# Patient Record
Sex: FEMALE | URBAN - METROPOLITAN AREA
[De-identification: names, ages, dates, MRNs, and addresses within clinical notes are randomized per-mention and may not be internally consistent; named-entity substitution may affect disease eponyms.]

---

## 2020-12-02 ENCOUNTER — APPOINTMENT (RX ONLY)
Dept: URBAN - METROPOLITAN AREA CLINIC 55 | Facility: CLINIC | Age: 55
Setting detail: DERMATOLOGY
End: 2020-12-02

## 2020-12-02 DIAGNOSIS — Z41.9 ENCOUNTER FOR PROCEDURE FOR PURPOSES OTHER THAN REMEDYING HEALTH STATE, UNSPECIFIED: ICD-10-CM

## 2020-12-02 PROCEDURE — ? DYSPORT

## 2020-12-02 PROCEDURE — ? FILLERS

## 2020-12-02 NOTE — PROCEDURE: FILLERS
Consent: Written consent was obtained.
Lot #: [id number]
Nasolabial Folds Filler Volume In Cc: 0
Filler Comments: Cheeks
Include Cannula Information In Note?: No
Anesthesia Type: 1% lidocaine with epinephrine
Map Statment: See Attach Map for Complete Details
Anesthesia Volume In Cc: 0.5
Filler: Edelmira Dickson
Expiration Date (Month Year): 03/31/2023
Post-Care Instructions: After care instructions were provided to the patient.
Expiration Date (Month Year): 3/31/2023
Detail Level: Simple
Filler Comments: NALLELY/Glen
Include Cannula Size?: 27G

## 2020-12-02 NOTE — PROCEDURE: DYSPORT
Right Pupillary Line Units: 0
Additional Area 1 Location: chin
Show Right And Left Periorbital Units: No
Detail Level: Simple
Show Glabellar Units: Yes
Lot #: [id number]
Glabellar Complex Units: 50
Consent: Written consent was obtained.
Forehead Units: 2.5
Expiration Date (Month Year): 07/31/2021
Dilution (U/0.1 Cc): 10
Post-Care Instructions: After care instructions were provided to the patient.

## 2021-03-05 ENCOUNTER — APPOINTMENT (RX ONLY)
Dept: URBAN - METROPOLITAN AREA CLINIC 55 | Facility: CLINIC | Age: 56
Setting detail: DERMATOLOGY
End: 2021-03-05

## 2021-03-05 DIAGNOSIS — Z41.9 ENCOUNTER FOR PROCEDURE FOR PURPOSES OTHER THAN REMEDYING HEALTH STATE, UNSPECIFIED: ICD-10-CM

## 2021-03-05 PROCEDURE — ? DYSPORT

## 2021-03-05 NOTE — PROCEDURE: DYSPORT
Right Pupillary Line Units: 0
Show Depressor Anguli Units: Yes
Consent: Written consent was obtained.
Show Lcl Units: No
Post-Care Instructions: After care instructions were provided to the patient.
Dilution (U/0.1 Cc): 10
Detail Level: Simple
Lot #: [id number]
Glabellar Complex Units: 52.5
Additional Area 1 Location: chin
Expiration Date (Month Year): 06/30/21

## 2022-02-24 ENCOUNTER — APPOINTMENT (RX ONLY)
Dept: URBAN - METROPOLITAN AREA CLINIC 55 | Facility: CLINIC | Age: 57
Setting detail: DERMATOLOGY
End: 2022-02-24

## 2022-02-24 DIAGNOSIS — Z41.9 ENCOUNTER FOR PROCEDURE FOR PURPOSES OTHER THAN REMEDYING HEALTH STATE, UNSPECIFIED: ICD-10-CM

## 2022-02-24 PROCEDURE — ? FILLERS

## 2022-02-24 PROCEDURE — ? LIQUID NITROGEN (COSMETIC)

## 2022-02-24 ASSESSMENT — LOCATION DETAILED DESCRIPTION DERM: LOCATION DETAILED: LEFT SUPERIOR LATERAL MALAR CHEEK

## 2022-02-24 ASSESSMENT — LOCATION ZONE DERM: LOCATION ZONE: FACE

## 2022-02-24 ASSESSMENT — LOCATION SIMPLE DESCRIPTION DERM: LOCATION SIMPLE: LEFT CHEEK

## 2022-02-24 NOTE — PROCEDURE: LIQUID NITROGEN (COSMETIC)
Total Number Of Lesions Treated: 1
Duration Of Freeze Thaw-Cycle (Seconds): 0
Post-care instructions were reviewed. Patient is to wear sun protection and avoid picking at any of the treated lesions. Pt may apply Vaseline to crusted or scabbing areas.
Render Post Care In The Note?: yes
Detail Level: Zone

## 2022-02-24 NOTE — PROCEDURE: FILLERS
Nasolabial Folds Filler Volume In Cc: 0
Include Cannula Information In Note?: No
Anesthesia Type: 1% lidocaine with epinephrine
Detail Level: Simple
Anesthesia Volume In Cc: 0.5
Map Statment: See Attach Map for Complete Details
Include Cannula Size?: 27G
Post-Care Instructions: After care instructions were provided to the patient.
Include Cannula Information In Note?: Yes
Filler: Edelmira Dickson
Expiration Date (Month Year): 7/24
Lot #: [id number]
Consent: Written consent was obtained.

## 2022-03-02 ENCOUNTER — APPOINTMENT (RX ONLY)
Dept: URBAN - METROPOLITAN AREA CLINIC 55 | Facility: CLINIC | Age: 57
Setting detail: DERMATOLOGY
End: 2022-03-02

## 2022-03-02 DIAGNOSIS — Z41.9 ENCOUNTER FOR PROCEDURE FOR PURPOSES OTHER THAN REMEDYING HEALTH STATE, UNSPECIFIED: ICD-10-CM

## 2022-03-02 PROCEDURE — ? HYDRAFACIAL

## 2022-03-02 ASSESSMENT — LOCATION SIMPLE DESCRIPTION DERM: LOCATION SIMPLE: RIGHT FOREHEAD

## 2022-03-02 ASSESSMENT — LOCATION DETAILED DESCRIPTION DERM: LOCATION DETAILED: RIGHT INFERIOR MEDIAL FOREHEAD

## 2022-03-02 ASSESSMENT — LOCATION ZONE DERM: LOCATION ZONE: FACE

## 2022-03-02 NOTE — PROCEDURE: HYDRAFACIAL
Vacuum Pressure High Setting (Will Not Render If Set To 0): 0
Solution: Beta-HD
Solution Override
Tip: Hydropeel Tip, Teal
Solution: GlySal 7.5%
Location: face
Treatment Number: 1
Post-Care Instructions: I reviewed with the patient in detail post-care instructions. Patient should stay away from the sun and wear sun protection until treated areas are fully healed.
Tip: Hydropeel Tip, Clear
Tip: Hydropeel Tip, Blue
Indication: anti-aging
Solution: Activ-4
Tip Override
Consent: Written consent obtained, risks reviewed including but not limited to crusting, scabbing, blistering, scarring, darker or lighter pigmentary change, bruising, and/or incomplete response.

## 2022-06-28 ENCOUNTER — APPOINTMENT (RX ONLY)
Dept: URBAN - METROPOLITAN AREA CLINIC 55 | Facility: CLINIC | Age: 57
Setting detail: DERMATOLOGY
End: 2022-06-28

## 2022-06-28 DIAGNOSIS — Z41.9 ENCOUNTER FOR PROCEDURE FOR PURPOSES OTHER THAN REMEDYING HEALTH STATE, UNSPECIFIED: ICD-10-CM

## 2022-06-28 PROCEDURE — ? FILLERS

## 2022-06-28 PROCEDURE — ? DYSPORT

## 2022-06-28 NOTE — PROCEDURE: FILLERS
Brows Filler Volume In Cc: 0
Filler: Edelmira Dickson
Anesthesia Volume In Cc: 0.5
Include Cannula Information In Note?: No
Include Cannula Size?: 27G
Map Statment: See Attach Map for Complete Details
Aspiration Statement: Aspiration was performed prior to injecting site with filler.
Lot #: [id number]
Detail Level: Simple
Include Cannula Information In Note?: Yes
Anesthesia Type: 1% lidocaine with epinephrine
Expiration Date (Month Year): 11/2023
Consent: Written consent was obtained.
Post-Care Instructions: After care instructions were provided to the patient.

## 2022-06-28 NOTE — PROCEDURE: DYSPORT
Additional Area 1 Location: chin
Show Lateral Platysmal Band Units: Yes
Mentalis Units: 0
Glabellar Complex Units: 52.5
Dilution (U/0.1 Cc): 10
Show Right And Left Pupillary Line Units: No
Expiration Date (Month Year): 2/28/2023
Detail Level: Simple
Lot #: [id number]
Post-Care Instructions: After care instructions were provided to the patient.
Consent: Written consent was obtained.

## 2023-05-17 ENCOUNTER — RX ONLY (OUTPATIENT)
Age: 58
Setting detail: RX ONLY
End: 2023-05-17

## 2023-05-17 ENCOUNTER — APPOINTMENT (RX ONLY)
Dept: URBAN - METROPOLITAN AREA CLINIC 55 | Facility: CLINIC | Age: 58
Setting detail: DERMATOLOGY
End: 2023-05-17

## 2023-05-17 DIAGNOSIS — Z41.9 ENCOUNTER FOR PROCEDURE FOR PURPOSES OTHER THAN REMEDYING HEALTH STATE, UNSPECIFIED: ICD-10-CM

## 2023-05-17 PROCEDURE — ? FILLERS

## 2023-05-17 PROCEDURE — ? DYSPORT

## 2023-05-17 PROCEDURE — ? INVENTORY

## 2023-05-17 RX ORDER — TRIAMCINOLONE ACETONIDE 1 MG/G
CREAM TOPICAL
Qty: 15 | Refills: 1 | Status: ERX | COMMUNITY
Start: 2023-05-17

## 2023-05-17 NOTE — PROCEDURE: FILLERS
[place] Filler Volume In Cc: 0
Include Cannula Size?: 27G
Filler: RHA Redensity
Include Cannula Information In Note?: No
Filler: RHA 4
Detail Level: Detailed
Post-Care Instructions: After care instructions were provided verbally and in writing.
Consent was obtained.
Map Statment: See Attach Map for Complete Details
Anesthesia Type: 1% lidocaine with epinephrine
Include Cannula Information In Note?: Yes
Anesthesia Volume In Cc: 0.5

## 2023-05-17 NOTE — PROCEDURE: DYSPORT
Additional Area 6 Units: 0
Show Nasal Units: Yes
Consent was obtained.
Show Ucl Units: No
Post-Care Instructions: Patient instructed to not lie down for 4 hours and limit physical activity for 24 hours.
Forehead Units: 10
Glabellar Complex Units: 40
Detail Level: Detailed
Show Inventory Tab: Show

## 2023-06-01 ENCOUNTER — APPOINTMENT (RX ONLY)
Dept: URBAN - METROPOLITAN AREA CLINIC 55 | Facility: CLINIC | Age: 58
Setting detail: DERMATOLOGY
End: 2023-06-01

## 2023-06-01 DIAGNOSIS — Z41.9 ENCOUNTER FOR PROCEDURE FOR PURPOSES OTHER THAN REMEDYING HEALTH STATE, UNSPECIFIED: ICD-10-CM

## 2023-06-01 PROCEDURE — ? DYSPORT

## 2023-06-01 PROCEDURE — ? INVENTORY

## 2023-06-01 PROCEDURE — ? COSMETIC FOLLOW-UP

## 2023-06-01 NOTE — PROCEDURE: DYSPORT
Additional Area 1 Units: 0
Detail Level: Detailed
Show Lcl Units: No
Show Additional Area 5: Yes
Show Inventory Tab: Show
Dilution (U/0.1 Cc): 10
Consent was obtained.
Post-Care Instructions: Patient instructed to not lie down for 4 hours and limit physical activity for 24 hours.
Forehead Units: 5

## 2023-09-07 ENCOUNTER — APPOINTMENT (RX ONLY)
Dept: URBAN - METROPOLITAN AREA CLINIC 55 | Facility: CLINIC | Age: 58
Setting detail: DERMATOLOGY
End: 2023-09-07

## 2023-09-07 DIAGNOSIS — Z41.9 ENCOUNTER FOR PROCEDURE FOR PURPOSES OTHER THAN REMEDYING HEALTH STATE, UNSPECIFIED: ICD-10-CM

## 2023-09-07 PROCEDURE — ? INVENTORY

## 2023-09-07 PROCEDURE — ? FILLERS

## 2023-09-07 PROCEDURE — ? DYSPORT

## 2023-09-07 NOTE — PROCEDURE: DYSPORT
Show Levator Superior Units: Yes
Periorbital Skin Units: 0
Show Ucl Units: No
Forehead Units: 10
Detail Level: Detailed
Glabellar Complex Units: 40
Post-Care Instructions: Patient instructed to not lie down for 4 hours and limit physical activity for 24 hours.
Consent was obtained.
Show Inventory Tab: Show

## 2023-09-07 NOTE — PROCEDURE: FILLERS
Jawline Filler Volume In Cc: 0
Include Cannula Information In Note?: No
Consent was obtained.
Anesthesia Type: 1% lidocaine with epinephrine
Include Cannula Size?: 27G
Include Cannula Information In Note?: Yes
Post-Care Instructions: After care instructions were provided verbally and in writing.
Anesthesia Volume In Cc: 0.5
Detail Level: Detailed
Filler: RHA 2
Map Statment: See Attach Map for Complete Details

## 2023-09-13 ENCOUNTER — APPOINTMENT (RX ONLY)
Dept: URBAN - METROPOLITAN AREA CLINIC 55 | Facility: CLINIC | Age: 58
Setting detail: DERMATOLOGY
End: 2023-09-13

## 2023-09-13 DIAGNOSIS — Z41.9 ENCOUNTER FOR PROCEDURE FOR PURPOSES OTHER THAN REMEDYING HEALTH STATE, UNSPECIFIED: ICD-10-CM

## 2023-09-13 PROCEDURE — ? HYDRAFACIAL

## 2023-09-13 PROCEDURE — ? INVENTORY

## 2023-09-13 ASSESSMENT — LOCATION SIMPLE DESCRIPTION DERM: LOCATION SIMPLE: RIGHT FOREHEAD

## 2023-09-13 ASSESSMENT — LOCATION DETAILED DESCRIPTION DERM: LOCATION DETAILED: RIGHT INFERIOR MEDIAL FOREHEAD

## 2023-09-13 ASSESSMENT — LOCATION ZONE DERM: LOCATION ZONE: FACE

## 2023-09-13 NOTE — PROCEDURE: HYDRAFACIAL
Vacuum Pressure Low Setting (Will Not Render If Set To 0): 0
Tip Override
Treatment Number: 1
Solution Override
Comments: Deluxe HF. Blue tip, peel prep, Alastin booster, 10 min red led
Indication: skin texture
Location: face
Consent: Written consent obtained, risks reviewed including but not limited to crusting, scabbing, blistering, scarring, darker or lighter pigmentary change, bruising, and/or incomplete response.
Post-Care Instructions: I reviewed with the patient in detail post-care instructions. Patient should stay away from the sun and wear sun protection until treated areas are fully healed.

## 2023-11-17 ENCOUNTER — APPOINTMENT (RX ONLY)
Dept: URBAN - METROPOLITAN AREA CLINIC 55 | Facility: CLINIC | Age: 58
Setting detail: DERMATOLOGY
End: 2023-11-17

## 2023-11-17 DIAGNOSIS — Z41.9 ENCOUNTER FOR PROCEDURE FOR PURPOSES OTHER THAN REMEDYING HEALTH STATE, UNSPECIFIED: ICD-10-CM

## 2023-11-17 PROCEDURE — ? HYDRAFACIAL

## 2023-11-17 PROCEDURE — ? INVENTORY

## 2023-11-17 ASSESSMENT — LOCATION ZONE DERM: LOCATION ZONE: FACE

## 2023-11-17 ASSESSMENT — LOCATION DETAILED DESCRIPTION DERM: LOCATION DETAILED: GLABELLA

## 2023-11-17 ASSESSMENT — LOCATION SIMPLE DESCRIPTION DERM: LOCATION SIMPLE: GLABELLA

## 2023-11-17 NOTE — PROCEDURE: HYDRAFACIAL
Vacuum Pressure High Setting (Will Not Render If Set To 0): 0
Tip Override
Solution Override
Location: face
Post-Care Instructions: I reviewed with the patient in detail post-care instructions. Patient should stay away from the sun and wear sun protection until treated areas are fully healed.
Consent: Written consent obtained, risks reviewed including but not limited to crusting, scabbing, blistering, scarring, darker or lighter pigmentary change, bruising, and/or incomplete response.
Comments: Deluxe HF. Blue tip, peel pre, Dermal repair, 10 min red led
Indication: skin texture

## 2023-12-12 ENCOUNTER — APPOINTMENT (RX ONLY)
Dept: URBAN - METROPOLITAN AREA CLINIC 55 | Facility: CLINIC | Age: 58
Setting detail: DERMATOLOGY
End: 2023-12-12

## 2023-12-12 ENCOUNTER — RX ONLY (OUTPATIENT)
Age: 58
Setting detail: RX ONLY
End: 2023-12-12

## 2023-12-12 DIAGNOSIS — Z41.9 ENCOUNTER FOR PROCEDURE FOR PURPOSES OTHER THAN REMEDYING HEALTH STATE, UNSPECIFIED: ICD-10-CM

## 2023-12-12 PROCEDURE — ? COSMETIC CONSULTATION: GENERAL

## 2023-12-12 PROCEDURE — ? INVENTORY

## 2023-12-12 RX ORDER — TRIAMCINOLONE ACETONIDE 1 MG/G
OINTMENT TOPICAL
Qty: 30 | Refills: 0 | Status: ERX | COMMUNITY
Start: 2023-12-12

## 2024-05-23 ENCOUNTER — APPOINTMENT (RX ONLY)
Dept: URBAN - METROPOLITAN AREA CLINIC 55 | Facility: CLINIC | Age: 59
Setting detail: DERMATOLOGY
End: 2024-05-23

## 2024-05-23 DIAGNOSIS — Z41.9 ENCOUNTER FOR PROCEDURE FOR PURPOSES OTHER THAN REMEDYING HEALTH STATE, UNSPECIFIED: ICD-10-CM

## 2024-05-23 PROCEDURE — ? DYSPORT

## 2024-05-23 NOTE — PROCEDURE: DYSPORT
Show Levator Superior Units: Yes
Periorbital Skin Units: 30
Additional Area 3 Units: 0
Show Ucl Units: No
Forehead Units: 10
Detail Level: Detailed
Glabellar Complex Units: 40
Post-Care Instructions: Patient instructed to not lie down for 4 hours and limit physical activity for 24 hours.
Consent was obtained.
Show Inventory Tab: Show

## 2024-07-30 ENCOUNTER — APPOINTMENT (RX ONLY)
Dept: URBAN - METROPOLITAN AREA CLINIC 55 | Facility: CLINIC | Age: 59
Setting detail: DERMATOLOGY
End: 2024-07-30

## 2024-07-30 DIAGNOSIS — Z41.9 ENCOUNTER FOR PROCEDURE FOR PURPOSES OTHER THAN REMEDYING HEALTH STATE, UNSPECIFIED: ICD-10-CM

## 2024-07-30 PROCEDURE — ? HYDRAFACIAL

## 2024-07-30 PROCEDURE — ? INVENTORY

## 2024-07-30 ASSESSMENT — LOCATION SIMPLE DESCRIPTION DERM: LOCATION SIMPLE: RIGHT FOREHEAD

## 2024-07-30 ASSESSMENT — LOCATION DETAILED DESCRIPTION DERM: LOCATION DETAILED: RIGHT INFERIOR MEDIAL FOREHEAD

## 2024-07-30 ASSESSMENT — LOCATION ZONE DERM: LOCATION ZONE: FACE

## 2024-07-30 NOTE — PROCEDURE: HYDRAFACIAL
Number Of Passes Step 6: 0
Solution Override
Post-Care Instructions: I reviewed with the patient in detail post-care instructions. Patient should stay away from the sun and wear sun protection until treated areas are fully healed.
Comments: Deluxe HF. Blue tip, peel prep, Alastin booster, 10 min red led
Indication: skin texture
Tip Override
Location: face
Consent: Written consent obtained, risks reviewed including but not limited to crusting, scabbing, blistering, scarring, darker or lighter pigmentary change, bruising, and/or incomplete response.

## 2024-07-31 ENCOUNTER — APPOINTMENT (RX ONLY)
Dept: URBAN - METROPOLITAN AREA CLINIC 55 | Facility: CLINIC | Age: 59
Setting detail: DERMATOLOGY
End: 2024-07-31

## 2024-07-31 DIAGNOSIS — Z41.9 ENCOUNTER FOR PROCEDURE FOR PURPOSES OTHER THAN REMEDYING HEALTH STATE, UNSPECIFIED: ICD-10-CM

## 2024-07-31 PROCEDURE — ? INVENTORY

## 2024-07-31 PROCEDURE — ? HYDRAFACIAL

## 2024-07-31 ASSESSMENT — LOCATION ZONE DERM: LOCATION ZONE: FACE

## 2024-07-31 ASSESSMENT — LOCATION SIMPLE DESCRIPTION DERM: LOCATION SIMPLE: INFERIOR FOREHEAD

## 2024-07-31 ASSESSMENT — LOCATION DETAILED DESCRIPTION DERM: LOCATION DETAILED: INFERIOR MID FOREHEAD

## 2024-07-31 NOTE — PROCEDURE: HYDRAFACIAL
Location: face
Number Of Passes Step 5: 0
Tip Override
Consent: Written consent obtained, risks reviewed including but not limited to crusting, scabbing, blistering, scarring, darker or lighter pigmentary change, bruising, and/or incomplete response.
Post-Care Instructions: I reviewed with the patient in detail post-care instructions. Patient should stay away from the sun and wear sun protection until treated areas are fully healed.
Solution Override
Comments: Deluxe HF. Blue tip, peel prep, Alastin booster, 10 min red led
Indication: skin texture